# Patient Record
Sex: MALE | Race: WHITE | NOT HISPANIC OR LATINO | Employment: STUDENT | URBAN - METROPOLITAN AREA
[De-identification: names, ages, dates, MRNs, and addresses within clinical notes are randomized per-mention and may not be internally consistent; named-entity substitution may affect disease eponyms.]

---
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
## 2024-03-14 ENCOUNTER — OFFICE VISIT (OUTPATIENT)
Dept: FAMILY MEDICINE CLINIC | Facility: CLINIC | Age: 19
End: 2024-03-14
Payer: COMMERCIAL

## 2024-03-14 VITALS
WEIGHT: 179.2 LBS | DIASTOLIC BLOOD PRESSURE: 62 MMHG | RESPIRATION RATE: 16 BRPM | HEART RATE: 81 BPM | SYSTOLIC BLOOD PRESSURE: 120 MMHG | OXYGEN SATURATION: 98 % | BODY MASS INDEX: 26.54 KG/M2 | TEMPERATURE: 98.1 F | HEIGHT: 69 IN

## 2024-03-14 DIAGNOSIS — Z20.2 POSSIBLE EXPOSURE TO STD: ICD-10-CM

## 2024-03-14 DIAGNOSIS — J02.9 VIRAL PHARYNGITIS: Primary | ICD-10-CM

## 2024-03-14 PROCEDURE — 99213 OFFICE O/P EST LOW 20 MIN: CPT | Performed by: FAMILY MEDICINE

## 2024-03-14 NOTE — PROGRESS NOTES
Assessment/Plan:  Problem List Items Addressed This Visit    None  Visit Diagnoses     Viral pharyngitis    -  Primary    Possible exposure to STD        Relevant Orders    HIV 1/2 AG/AB W REFLEX [facility] and [facility] only    Herpes I/II IgG VONNIE w Reflex to HSV-2    Chlamydia/GC amplified DNA by PCR      I advised the patient that likely his symptoms related to a resolving viral pharyngitis.  On exam his throat actually looks improved.  I advised him that no further treatment is necessary.  I did advise him that his send out strep culture and EBV titers were negative for any acute infection.  It did show that he had evidence of a past exposure to EBV.  He did request STD testing after recent exposure which we did order and we will follow-up with the results.  He will call with any worsening symptoms.    Return if symptoms worsen or fail to improve.   I spent 15 minutes during the visit reviewing the history from the patient, performing the examination, discussing the findings with the patient, providing counseling and education, and making a plan. I spent 15 minutes ordering referrals and testing and documenting.    Subjective:   Chief Complaint   Patient presents with   • Follow-up     From urgent care        Patient ID: Marcos Arenas is a 19 y.o. male presents today for follow-up from recent urgent care visit.  Marcos Arenas is a 19 y.o. male who presents today for a follow-up from recent urgent care visit at [facility] on March 5, 2024 where he was diagnosed with acute pharyngitis.  He had a rapid strep test which came back negative.  They also did a send out strep culture which came back negative as well as a Monospot which was negative.  He did have an EBV titer which demonstrated past exposure to mono.  He is here today for follow-up.  He was told it ws viral- there is no change or improvement.  He is feeling the same.  There are no fevers.  There is no pain with swallowing.  There are sick  contacts.  He was having Flu like symptoms at onset and congestion and that is better. There is no cough. There is no pain.    The patient currently denies any nausea, vomiting, or GERD symptoms.  he has normal bowel movements and normal urine output.  he has a normal appetite.  He is feeling better, but he does not like how his throat looks.  There is no dysphagia.    He did have a little cyst inside his lower gum which has resolved.  He was concerned about this.  He reports that he went to be tested for STDs because of an episode of unprotected sex over the last several weeks.  There are no symptoms.      Sore Throat   This is a new problem. The current episode started 1 to 4 weeks ago. The problem has been gradually improving. There has been no fever. The patient is experiencing no pain. Pertinent negatives include no abdominal pain, congestion, coughing, diarrhea, drooling, ear discharge, ear pain, headaches, hoarse voice, plugged ear sensation, neck pain, shortness of breath, stridor, swollen glands, trouble swallowing or vomiting.     The following portions of the patient's history were reviewed and updated as appropriate: allergies, current medications, past family history, past medical history, past social history, past surgical history and problem list.  Patient Active Problem List   Diagnosis   • Acute seasonal allergic rhinitis   • Muscle spasm     Past Medical History:   Diagnosis Date   • Allergic     seasonal   • Hemorrhoids    • Scoliosis      Past Surgical History:   Procedure Laterality Date   • FOOT SURGERY Right     mole removal.     • FOOT SURGERY Right    • WISDOM TOOTH EXTRACTION       Allergies   Allergen Reactions   • Acetaminophen-Codeine Other (See Comments)     hyperactive/anxious per mom     Family History   Problem Relation Age of Onset   • Colon polyps Mother    • Arthritis Mother    • Asthma Mother    • Hearing loss Mother    • Irritable bowel syndrome Mother    • Colon polyps Father     • No Known Problems Brother    • Arthritis Maternal Grandfather    • Colon polyps Maternal Grandfather    • Arthritis Maternal Grandmother    • Heart disease Maternal Grandmother         AFpito, PVCs     Social History     Socioeconomic History   • Marital status: Single     Spouse name: Not on file   • Number of children: Not on file   • Years of education: Not on file   • Highest education level: Not on file   Occupational History   • Not on file   Tobacco Use   • Smoking status: Never     Passive exposure: Never   • Smokeless tobacco: Never   Vaping Use   • Vaping status: Never Used   Substance and Sexual Activity   • Alcohol use: Never   • Drug use: Never   • Sexual activity: Never   Other Topics Concern   • Not on file   Social History Narrative   • Not on file     Social Determinants of Health     Financial Resource Strain: Not on file   Food Insecurity: Not on file   Transportation Needs: Not on file   Physical Activity: Sufficiently Active (4/7/2021)    Exercise Vital Sign    • Days of Exercise per Week: 5 days    • Minutes of Exercise per Session: 60 min   Stress: No Stress Concern Present (4/7/2021)    [employer] [employer] - Occupational Stress Questionnaire    • Feeling of Stress : Not at all   Social Connections: Not on file   Intimate Partner Violence: Not At Risk (3/14/2024)    Humiliation, Afraid, Rape, and Kick questionnaire    • Fear of Current or Ex-Partner: No    • Emotionally Abused: No    • Physically Abused: No    • Sexually Abused: No   Housing Stability: Not on file     Current Outpatient Medications on File Prior to Visit   Medication Sig Dispense Refill   • [DISCONTINUED] brompheniramine-pseudoephedrine-DM 30-2-10 MG/5ML syrup Swallow 10ml q6hrs prn congestion/cough (Patient not taking: Reported on 12/26/2023)     • [DISCONTINUED] methylPREDNISolone 4 MG tablet therapy pack Use as directed on package 1 each 0     No current facility-administered medications on file prior  "to visit.     Review of Systems   Constitutional: Negative.    HENT:  Positive for sore throat. Negative for congestion, drooling, ear discharge, ear pain, hoarse voice and trouble swallowing.    Eyes: Negative.    Respiratory: Negative.  Negative for cough, shortness of breath and stridor.    Cardiovascular: Negative.    Gastrointestinal: Negative.  Negative for abdominal pain, diarrhea and vomiting.   Endocrine: Negative.    Genitourinary: Negative.    Musculoskeletal: Negative.  Negative for neck pain.   Skin: Negative.    Allergic/Immunologic: Negative.    Neurological: Negative.  Negative for headaches.   Hematological: Negative.    Psychiatric/Behavioral: Negative.         Objective:  Vitals:    03/14/24 0828   BP: 120/62   BP Location: Left arm   Patient Position: Sitting   Cuff Size: Large   Pulse: 81   Resp: 16   Temp: 98.1 °F (36.7 °C)   SpO2: 98%   Weight: 81.3 kg (179 lb 3.2 oz)   Height: 5' 9\" (1.753 m)     Body mass index is 26.46 kg/m².     Physical Exam  Vitals and nursing note reviewed.   Constitutional:       General: He is not in acute distress.     Appearance: Normal appearance. He is well-developed. He is not diaphoretic.   HENT:      Head: Normocephalic and atraumatic.      Right Ear: Tympanic membrane, ear canal and external ear normal.      Left Ear: Tympanic membrane, ear canal and external ear normal.      Nose: No congestion or rhinorrhea.      Mouth/Throat:      Mouth: Mucous membranes are moist.      Pharynx: Oropharynx is clear.   Eyes:      Extraocular Movements: Extraocular movements intact.      Pupils: Pupils are equal, round, and reactive to light.   Neck:      Thyroid: No thyromegaly.      Vascular: No JVD.      Trachea: No tracheal deviation.   Cardiovascular:      Rate and Rhythm: Normal rate and regular rhythm.      Heart sounds: Normal heart sounds. No murmur heard.     No friction rub. No gallop.   Pulmonary:      Effort: Pulmonary effort is normal. No respiratory distress. "      Breath sounds: Normal breath sounds. No stridor. No wheezing or rales.   Chest:      Chest wall: No tenderness.   Abdominal:      General: Bowel sounds are normal. There is no distension.      Palpations: Abdomen is soft. There is no mass.      Tenderness: There is no abdominal tenderness. There is no guarding or rebound.   Musculoskeletal:         General: Normal range of motion.      Cervical back: Normal range of motion and neck supple.   Lymphadenopathy:      Cervical: No cervical adenopathy.   Skin:     General: Skin is warm and dry.      Coloration: Skin is not pale.      Findings: No erythema or rash.   Neurological:      Mental Status: He is alert and oriented to person, place, and time.      Cranial Nerves: No cranial nerve deficit.      Motor: No abnormal muscle tone.      Coordination: Coordination normal.      Deep Tendon Reflexes: Reflexes are normal and symmetric. Reflexes normal.             Depression Screening and Follow-up Plan: Patient was screened for depression during today's encounter. They screened negative with a PHQ-2 score of 0.

## 2024-03-15 LAB
C TRACH RRNA SPEC QL NAA+PROBE: NOT DETECTED
HIV 1+2 AB+HIV1 P24 AG SERPL QL IA: NORMAL
HSV1 IGG SER IA-ACNC: <0.9 INDEX
HSV2 IGG SER IA-ACNC: 1.05 INDEX
N GONORRHOEA RRNA SPEC QL NAA+PROBE: NOT DETECTED

## 2024-04-10 LAB
ALBUMIN SERPL-MCNC: 4.5 G/DL (ref 3.6–5.1)
ALBUMIN/GLOB SERPL: 1.6 (CALC) (ref 1–2.5)
ALP SERPL-CCNC: 95 U/L (ref 36–130)
ALT SERPL-CCNC: 20 U/L (ref 9–46)
APPEARANCE UR: CLEAR
AST SERPL-CCNC: 20 U/L (ref 10–40)
BILIRUB SERPL-MCNC: 1.3 MG/DL (ref 0.2–1.2)
BILIRUB UR QL STRIP: NEGATIVE
BUN SERPL-MCNC: 19 MG/DL (ref 7–25)
BUN/CREAT SERPL: ABNORMAL (CALC) (ref 6–22)
CALCIUM SERPL-MCNC: 9.8 MG/DL (ref 8.6–10.3)
CHLORIDE SERPL-SCNC: 102 MMOL/L (ref 98–110)
CHOLEST SERPL-MCNC: 177 MG/DL
CHOLEST/HDLC SERPL: 2.9 (CALC)
CO2 SERPL-SCNC: 28 MMOL/L (ref 20–32)
COLOR UR: YELLOW
CREAT SERPL-MCNC: 0.96 MG/DL (ref 0.6–1.24)
GFR/BSA.PRED SERPLBLD CYS-BASED-ARV: 116 ML/MIN/1.73M2
GLOBULIN SER CALC-MCNC: 2.9 G/DL (CALC) (ref 1.9–3.7)
GLUCOSE SERPL-MCNC: 93 MG/DL (ref 65–99)
GLUCOSE UR QL STRIP: NEGATIVE
HDLC SERPL-MCNC: 62 MG/DL
HGB UR QL STRIP: NEGATIVE
KETONES UR QL STRIP: NEGATIVE
LDLC SERPL CALC-MCNC: 99 MG/DL (CALC)
LDLC SERPL DIRECT ASSAY-MCNC: 100 MG/DL
LEUKOCYTE ESTERASE UR QL STRIP: NEGATIVE
NITRITE UR QL STRIP: NEGATIVE
NONHDLC SERPL-MCNC: 115 MG/DL (CALC)
PH UR STRIP: 7 [PH] (ref 5–8)
POTASSIUM SERPL-SCNC: 4.4 MMOL/L (ref 3.5–5.3)
PROT SERPL-MCNC: 7.4 G/DL (ref 6.1–8.1)
PROT UR QL STRIP: NEGATIVE
SODIUM SERPL-SCNC: 139 MMOL/L (ref 135–146)
SP GR UR STRIP: 1 (ref 1–1.03)
TRIGL SERPL-MCNC: 75 MG/DL
TSH SERPL-ACNC: 1.31 MIU/L (ref 0.4–4.5)

## 2024-04-18 ENCOUNTER — OFFICE VISIT (OUTPATIENT)
Dept: FAMILY MEDICINE CLINIC | Facility: CLINIC | Age: 19
End: 2024-04-18
Payer: COMMERCIAL

## 2024-04-18 VITALS
OXYGEN SATURATION: 97 % | WEIGHT: 180 LBS | HEART RATE: 95 BPM | RESPIRATION RATE: 16 BRPM | DIASTOLIC BLOOD PRESSURE: 70 MMHG | TEMPERATURE: 97.9 F | BODY MASS INDEX: 26.66 KG/M2 | HEIGHT: 69 IN | SYSTOLIC BLOOD PRESSURE: 120 MMHG

## 2024-04-18 DIAGNOSIS — Z00.00 ANNUAL PHYSICAL EXAM: Primary | ICD-10-CM

## 2024-04-18 DIAGNOSIS — Z20.2 POSSIBLE EXPOSURE TO STD: ICD-10-CM

## 2024-04-18 PROCEDURE — 99395 PREV VISIT EST AGE 18-39: CPT | Performed by: FAMILY MEDICINE

## 2024-04-18 NOTE — PROGRESS NOTES
ADULT ANNUAL PHYSICAL  [facility]    NAME: Marcos Arenas  AGE: 19 y.o. SEX: male  : 2005     DATE: 2024     Assessment and Plan:     Problem List Items Addressed This Visit    None  Visit Diagnoses     Annual physical exam    -  Primary    Possible exposure to STD        Relevant Orders    Herpes I/II IgG VONNIE w Reflex to HSV-2      The patient had a normal exam in the office today.  He will continue with his healthy diet and exercise.  His lab work all look very good.  He did have evidence of an equivocal herpes antibody on his lab work.  We will send for the additional testing in a month as ordered.  We will see him back in the office in a year for physical.      Immunizations and preventive care screenings were discussed with patient today. Appropriate education was printed on patient's after visit summary.    Counseling:  Dental Health: discussed importance of regular tooth brushing, flossing, and dental visits.  Injury prevention: discussed safety/seat belts, safety helmets, smoke detectors, carbon dioxide detectors, and smoking near bedding or upholstery.  Exercise: the importance of regular exercise/physical activity was discussed. Recommend exercise 3-5 times per week for at least 30 minutes.       Depression Screening and Follow-up Plan: Patient was screened for depression during today's encounter. They screened negative with a PHQ-2 score of 0.        Return in about 1 year (around 2025) for Annual physical.     Chief Complaint:     Chief Complaint   Patient presents with   • Annual Exam      History of Present Illness:     Adult Annual Physical   Patient here for a comprehensive physical exam. The patient reports no problems.  The patient denies any chest pain, shortness of breath, or palpitations.  There is no edema.  There are no headaches or visual changes.  There is no lightheadedness, dizziness, or fainting  spells.  The patient currently denies any nausea, vomiting, or GERD symptoms.  he has normal bowel movements and normal urine output.  he has a normal appetite.  He is working on improving his diet.    Diet and Physical Activity  Diet/Nutrition: well balanced diet, limited junk food, low carb diet, and consuming 3-5 servings of fruits/vegetables daily.   Exercise: moderate cardiovascular exercise.      Depression Screening  PHQ-2/9 Depression Screening    Little interest or pleasure in doing things: 0 - not at all  Feeling down, depressed, or hopeless: 0 - not at all  PHQ-2 Score: 0  PHQ-2 Interpretation: Negative depression screen       General Health  Sleep: sleeps well.   Hearing: normal - bilateral.  Vision: no vision problems and most recent eye exam <1 year ago.   Dental: regular dental visits and brushes teeth twice daily.        Health  History of STDs?: no.         Review of Systems:     Review of Systems   Constitutional: Negative.    HENT: Negative.     Eyes: Negative.    Respiratory: Negative.     Cardiovascular: Negative.    Gastrointestinal: Negative.    Endocrine: Negative.    Genitourinary: Negative.    Musculoskeletal: Negative.    Skin: Negative.    Allergic/Immunologic: Negative.    Neurological: Negative.    Hematological: Negative.    Psychiatric/Behavioral: Negative.        Past Medical History:     Past Medical History:   Diagnosis Date   • Allergic     seasonal   • Hemorrhoids    • Scoliosis       Past Surgical History:     Past Surgical History:   Procedure Laterality Date   • FOOT SURGERY Right     mole removal.     • FOOT SURGERY Right    • WISDOM TOOTH EXTRACTION        Social History:     Social History     Socioeconomic History   • Marital status: Single     Spouse name: None   • Number of children: None   • Years of education: None   • Highest education level: None   Occupational History   • None   Tobacco Use   • Smoking status: Never     Passive exposure: Never   • Smokeless  "tobacco: Never   Vaping Use   • Vaping status: Never Used   Substance and Sexual Activity   • Alcohol use: Never   • Drug use: Never   • Sexual activity: Never   Other Topics Concern   • None   Social History Narrative   • None     Social Determinants of Health     Financial Resource Strain: Not on file   Food Insecurity: Not on file   Transportation Needs: Not on file   Physical Activity: Sufficiently Active (4/7/2021)    Exercise Vital Sign    • Days of Exercise per Week: 5 days    • Minutes of Exercise per Session: 60 min   Stress: No Stress Concern Present (4/7/2021)    [employer] [place] - Occupational Stress Questionnaire    • Feeling of Stress : Not at all   Social Connections: Not on file   Intimate Partner Violence: Not At Risk (3/14/2024)    Humiliation, Afraid, Rape, and Kick questionnaire    • Fear of Current or Ex-Partner: No    • Emotionally Abused: No    • Physically Abused: No    • Sexually Abused: No   Housing Stability: Not on file      Family History:     Family History   Problem Relation Age of Onset   • Colon polyps Mother    • Arthritis Mother    • Asthma Mother    • Hearing loss Mother    • Irritable bowel syndrome Mother    • Colon polyps Father    • No Known Problems Brother    • Arthritis Maternal Grandfather    • Colon polyps Maternal Grandfather    • Arthritis Maternal Grandmother    • Heart disease Maternal Grandmother         AFib, PVCs      Current Medications:     No current outpatient medications on file.     No current facility-administered medications for this visit.      Allergies:     Allergies   Allergen Reactions   • Acetaminophen-Codeine Other (See Comments)     hyperactive/anxious per mom      Physical Exam:     /70 (BP Location: Right arm, Patient Position: Sitting, Cuff Size: Standard)   Pulse 95   Temp 97.9 °F (36.6 °C) (Tympanic)   Resp 16   Ht 5' 9\" (1.753 m)   Wt 81.6 kg (180 lb)   SpO2 97%   BMI 26.58 kg/m²     Physical Exam  Vitals " and nursing note reviewed. Exam conducted with a chaperone present.   Constitutional:       Appearance: Normal appearance. He is well-developed.   HENT:      Head: Normocephalic and atraumatic.      Right Ear: External ear normal.      Left Ear: External ear normal.      Nose: Nose normal.      Mouth/Throat:      Mouth: Mucous membranes are moist.      Pharynx: Oropharynx is clear. No oropharyngeal exudate.   Eyes:      Extraocular Movements: Extraocular movements intact.      Conjunctiva/sclera: Conjunctivae normal.      Pupils: Pupils are equal, round, and reactive to light.   Neck:      Thyroid: No thyromegaly.      Trachea: No tracheal deviation.   Cardiovascular:      Rate and Rhythm: Normal rate and regular rhythm.      Pulses: Normal pulses.      Heart sounds: Normal heart sounds. No murmur heard.     No friction rub. No gallop.   Pulmonary:      Effort: Pulmonary effort is normal. No respiratory distress.      Breath sounds: Normal breath sounds. No stridor. No wheezing or rales.   Chest:      Chest wall: No tenderness.   Abdominal:      General: Bowel sounds are normal. There is no distension.      Palpations: Abdomen is soft. There is no mass.      Tenderness: There is no abdominal tenderness. There is no guarding or rebound.      Hernia: No hernia is present.   Genitourinary:     Penis: Normal. No tenderness.       Testes: Normal.      Comments: There were no external hernias.  Musculoskeletal:         General: No tenderness or deformity. Normal range of motion.      Cervical back: Normal range of motion and neck supple.   Lymphadenopathy:      Cervical: No cervical adenopathy.   Skin:     General: Skin is warm and dry.      Coloration: Skin is not pale.      Findings: No erythema or rash.   Neurological:      Mental Status: He is alert and oriented to person, place, and time.      Cranial Nerves: No cranial nerve deficit.      Sensory: No sensory deficit.      Motor: No abnormal muscle tone.       Coordination: Coordination normal.      Deep Tendon Reflexes: Reflexes normal.   Psychiatric:         Mood and Affect: Mood normal.         Behavior: Behavior normal.         Thought Content: Thought content normal.         Judgment: Judgment normal.          Yessica Dao DO   [facility]

## 2024-04-19 PROBLEM — M62.838 MUSCLE SPASM: Status: RESOLVED | Noted: 2023-09-17 | Resolved: 2024-04-19

## 2024-05-02 LAB
HSV1 IGG SER IA-ACNC: <0.9 INDEX
HSV2 IGG SER IA-ACNC: 0.96 INDEX

## 2024-09-27 ENCOUNTER — OFFICE VISIT (OUTPATIENT)
Dept: FAMILY MEDICINE CLINIC | Facility: CLINIC | Age: 19
End: 2024-09-27
Payer: COMMERCIAL

## 2024-09-27 VITALS
TEMPERATURE: 98.4 F | HEART RATE: 76 BPM | HEIGHT: 69 IN | WEIGHT: 182 LBS | RESPIRATION RATE: 17 BRPM | SYSTOLIC BLOOD PRESSURE: 122 MMHG | OXYGEN SATURATION: 97 % | DIASTOLIC BLOOD PRESSURE: 70 MMHG | BODY MASS INDEX: 26.96 KG/M2

## 2024-09-27 DIAGNOSIS — M25.541 JOINT PAIN IN FINGERS OF BOTH HANDS: ICD-10-CM

## 2024-09-27 DIAGNOSIS — L70.0 ACNE VULGARIS: ICD-10-CM

## 2024-09-27 DIAGNOSIS — R53.83 FATIGUE, UNSPECIFIED TYPE: Primary | ICD-10-CM

## 2024-09-27 DIAGNOSIS — M25.542 JOINT PAIN IN FINGERS OF BOTH HANDS: ICD-10-CM

## 2024-09-27 PROCEDURE — 99214 OFFICE O/P EST MOD 30 MIN: CPT

## 2024-09-27 NOTE — PROGRESS NOTES
Ambulatory Visit  Name: Marcos Arenas      : 2005      MRN: [id number]  Encounter Provider: VERONIKA Chong  Encounter Date: 2024   Encounter department: [facility]    Assessment & Plan  Fatigue, unspecified type  Differential includes Lyme, iron or vitamin deficiency, infection. The patient will obtain the lab work ordered today. The patient will be notified of results when available and also any further recommendations.  Orders:    Comprehensive metabolic panel; Future    C-reactive protein; Future    Lyme Total AB W Reflex to IGM/IGG; Future    TSH, 3rd generation with Free T4 reflex; Future    Vitamin D 25 hydroxy; Future    CBC and differential; Future    Mononucleosis screen; Future    Iron, TIBC and Ferritin Panel; Future    YESSI Screen w/ Reflex to Titer/Pattern; Future    Rheumatoid Factor (IgA, IgG, IgM); Future    Comprehensive metabolic panel    C-reactive protein    Lyme Total AB W Reflex to IGM/IGG    TSH, 3rd generation with Free T4 reflex    Vitamin D 25 hydroxy    CBC and differential    Mononucleosis screen    Iron, TIBC and Ferritin Panel    YESSI Screen w/ Reflex to Titer/Pattern    Rheumatoid Factor (IgA, IgG, IgM)    Joint pain in fingers of both hands  Differential includes Lyme or inflammatory disease. The patient will obtain the lab work ordered today. The patient will be notified of results when available and also any further recommendations.  Orders:    Comprehensive metabolic panel; Future    C-reactive protein; Future    Lyme Total AB W Reflex to IGM/IGG; Future    TSH, 3rd generation with Free T4 reflex; Future    CBC and differential; Future    YESSI Screen w/ Reflex to Titer/Pattern; Future    Rheumatoid Factor (IgA, IgG, IgM); Future    Comprehensive metabolic panel    C-reactive protein    Lyme Total AB W Reflex to IGM/IGG    TSH, 3rd generation with Free T4 reflex    CBC and differential    YESSI Screen w/ Reflex to Titer/Pattern     "Rheumatoid Factor (IgA, IgG, IgM)    Acne vulgaris  Patient has upcoming appointment with Dermatology. Advised proper face washing, silk pillowcase, avoidance of sugary foods, avoiding touching face, avoiding scented body washes.      Follow up as needed or at next regularly scheduled appointment.     History of Present Illness     Marcos Arenas is a 19 y.o. year old male who presents today with a concern of fatigue and joint pain. Hands \"feel tight\"- no numbness or tingling. No swelling. Taking naps - unusual for him. Works outside a lot.       Fatigue  The current episode started more than 1 month ago (Started over the summer - beginning of July). The problem occurs daily. The problem has been gradually worsening. Associated symptoms include arthralgias and fatigue. Pertinent negatives include no abdominal pain, chest pain, chills, congestion, coughing, fever, headaches, joint swelling, myalgias, nausea, numbness, rash, sore throat, swollen glands, vomiting or weakness. Nothing aggravates the symptoms. He has tried rest (vitamins) for the symptoms. The treatment provided no relief.         Review of Systems   Constitutional:  Positive for fatigue. Negative for chills and fever.   HENT: Negative.  Negative for congestion, ear pain, rhinorrhea and sore throat.    Eyes: Negative.  Negative for pain and visual disturbance.   Respiratory: Negative.  Negative for cough and shortness of breath.    Cardiovascular: Negative.  Negative for chest pain, palpitations and leg swelling.   Gastrointestinal:  Negative for abdominal pain, constipation, diarrhea, nausea and vomiting.   Endocrine: Negative.    Genitourinary: Negative.  Negative for dysuria, frequency and urgency.   Musculoskeletal:  Positive for arthralgias. Negative for back pain, joint swelling and myalgias.   Skin: Negative.  Negative for rash.   Allergic/Immunologic: Negative.    Neurological: Negative.  Negative for dizziness, weakness, light-headedness, " "numbness and headaches.   Hematological: Negative.    Psychiatric/Behavioral: Negative.             Objective     /70 (BP Location: Left arm, Patient Position: Sitting, Cuff Size: Large)   Pulse 76   Temp 98.4 °F (36.9 °C) (Tympanic)   Resp 17   Ht 5' 9\" (1.753 m)   Wt 82.6 kg (182 lb)   SpO2 97%   BMI 26.88 kg/m²     Physical Exam  Vitals and nursing note reviewed.   Constitutional:       General: He is not in acute distress.     Appearance: Normal appearance. He is not ill-appearing.   HENT:      Head: Normocephalic and atraumatic.   Cardiovascular:      Rate and Rhythm: Normal rate and regular rhythm.      Heart sounds: Normal heart sounds. No murmur heard.  Pulmonary:      Effort: Pulmonary effort is normal. No respiratory distress.      Breath sounds: Normal breath sounds. No wheezing.   Abdominal:      General: Abdomen is flat. Bowel sounds are normal.      Palpations: Abdomen is soft.      Tenderness: There is no abdominal tenderness.   Musculoskeletal:         General: Normal range of motion.      Right hand: Normal. No swelling, deformity, tenderness or bony tenderness. Normal range of motion. Normal strength. Normal capillary refill. Normal pulse.      Left hand: Bony tenderness present. No swelling, deformity or tenderness. Normal range of motion. Normal strength. Normal capillary refill. Normal pulse.        Arms:       Cervical back: Normal range of motion.   Lymphadenopathy:      Cervical: No cervical adenopathy.   Skin:     General: Skin is warm and dry.      Capillary Refill: Capillary refill takes less than 2 seconds.   Neurological:      General: No focal deficit present.      Mental Status: He is alert and oriented to person, place, and time.   Psychiatric:         Mood and Affect: Mood normal.         Behavior: Behavior normal.         "

## 2024-09-27 NOTE — ASSESSMENT & PLAN NOTE
Patient has upcoming appointment with Dermatology. Advised proper face washing, silk pillowcase, avoidance of sugary foods, avoiding touching face, avoiding scented body washes.      Follow up as needed or at next regularly scheduled appointment.

## 2024-10-06 LAB
25(OH)D3 SERPL-MCNC: 64 NG/ML (ref 30–100)
ALBUMIN SERPL-MCNC: 4.5 G/DL (ref 3.6–5.1)
ALBUMIN/GLOB SERPL: 1.7 (CALC) (ref 1–2.5)
ALP SERPL-CCNC: 74 U/L (ref 36–130)
ALT SERPL-CCNC: 18 U/L (ref 9–46)
AST SERPL-CCNC: 20 U/L (ref 10–40)
BASOPHILS # BLD AUTO: 20 CELLS/UL (ref 0–200)
BASOPHILS NFR BLD AUTO: 0.6 %
BILIRUB SERPL-MCNC: 1.6 MG/DL (ref 0.2–1.2)
BUN SERPL-MCNC: 15 MG/DL (ref 7–25)
BUN/CREAT SERPL: ABNORMAL (CALC) (ref 6–22)
CALCIUM SERPL-MCNC: 9.6 MG/DL (ref 8.6–10.3)
CHLORIDE SERPL-SCNC: 102 MMOL/L (ref 98–110)
CO2 SERPL-SCNC: 29 MMOL/L (ref 20–32)
CREAT SERPL-MCNC: 0.94 MG/DL (ref 0.6–1.24)
CRP SERPL-MCNC: <3 MG/L
EOSINOPHIL # BLD AUTO: 191 CELLS/UL (ref 15–500)
EOSINOPHIL NFR BLD AUTO: 5.8 %
ERYTHROCYTE [DISTWIDTH] IN BLOOD BY AUTOMATED COUNT: 12.2 % (ref 11–15)
FERRITIN SERPL-MCNC: 72 NG/ML (ref 38–380)
GFR/BSA.PRED SERPLBLD CYS-BASED-ARV: 119 ML/MIN/1.73M2
GLOBULIN SER CALC-MCNC: 2.7 G/DL (CALC) (ref 1.9–3.7)
GLUCOSE SERPL-MCNC: 89 MG/DL (ref 65–99)
HCT VFR BLD AUTO: 46.7 % (ref 38.5–50)
HGB BLD-MCNC: 15.5 G/DL (ref 13.2–17.1)
IRON SATN MFR SERPL: 56 % (CALC) (ref 20–48)
IRON SERPL-MCNC: 196 MCG/DL (ref 50–195)
LYMPHOCYTES # BLD AUTO: 1073 CELLS/UL (ref 850–3900)
LYMPHOCYTES NFR BLD AUTO: 32.5 %
MCH RBC QN AUTO: 30.7 PG (ref 27–33)
MCHC RBC AUTO-ENTMCNC: 33.2 G/DL (ref 32–36)
MCV RBC AUTO: 92.5 FL (ref 80–100)
MONOCYTES # BLD AUTO: 432 CELLS/UL (ref 200–950)
MONOCYTES NFR BLD AUTO: 13.1 %
NEUTROPHILS # BLD AUTO: 1584 CELLS/UL (ref 1500–7800)
NEUTROPHILS NFR BLD AUTO: 48 %
PLATELET # BLD AUTO: 315 THOUSAND/UL (ref 140–400)
PMV BLD REES-ECKER: 8.5 FL (ref 7.5–12.5)
POTASSIUM SERPL-SCNC: 4.4 MMOL/L (ref 3.5–5.3)
PROT SERPL-MCNC: 7.2 G/DL (ref 6.1–8.1)
RBC # BLD AUTO: 5.05 MILLION/UL (ref 4.2–5.8)
SODIUM SERPL-SCNC: 139 MMOL/L (ref 135–146)
TIBC SERPL-MCNC: 348 MCG/DL (CALC) (ref 250–425)
TSH SERPL-ACNC: 1.36 MIU/L (ref 0.4–4.5)
WBC # BLD AUTO: 3.3 THOUSAND/UL (ref 3.8–10.8)

## 2024-10-09 LAB
25(OH)D3 SERPL-MCNC: 64 NG/ML (ref 30–100)
ALBUMIN SERPL-MCNC: 4.5 G/DL (ref 3.6–5.1)
ALBUMIN/GLOB SERPL: 1.7 (CALC) (ref 1–2.5)
ALP SERPL-CCNC: 74 U/L (ref 36–130)
ALT SERPL-CCNC: 18 U/L (ref 9–46)
ANA SER QL IF: NEGATIVE
AST SERPL-CCNC: 20 U/L (ref 10–40)
BASOPHILS # BLD AUTO: 20 CELLS/UL (ref 0–200)
BASOPHILS NFR BLD AUTO: 0.6 %
BILIRUB SERPL-MCNC: 1.6 MG/DL (ref 0.2–1.2)
BUN SERPL-MCNC: 15 MG/DL (ref 7–25)
BUN/CREAT SERPL: ABNORMAL (CALC) (ref 6–22)
CALCIUM SERPL-MCNC: 9.6 MG/DL (ref 8.6–10.3)
CHLORIDE SERPL-SCNC: 102 MMOL/L (ref 98–110)
CO2 SERPL-SCNC: 29 MMOL/L (ref 20–32)
CREAT SERPL-MCNC: 0.94 MG/DL (ref 0.6–1.24)
CRP SERPL-MCNC: <3 MG/L
EOSINOPHIL # BLD AUTO: 191 CELLS/UL (ref 15–500)
EOSINOPHIL NFR BLD AUTO: 5.8 %
ERYTHROCYTE [DISTWIDTH] IN BLOOD BY AUTOMATED COUNT: 12.2 % (ref 11–15)
FERRITIN SERPL-MCNC: 72 NG/ML (ref 38–380)
GFR/BSA.PRED SERPLBLD CYS-BASED-ARV: 119 ML/MIN/1.73M2
GLOBULIN SER CALC-MCNC: 2.7 G/DL (CALC) (ref 1.9–3.7)
GLUCOSE SERPL-MCNC: 89 MG/DL (ref 65–99)
HCT VFR BLD AUTO: 46.7 % (ref 38.5–50)
HETEROPH AB SER QL LA: NEGATIVE
HGB BLD-MCNC: 15.5 G/DL (ref 13.2–17.1)
IRON SATN MFR SERPL: 56 % (CALC) (ref 20–48)
IRON SERPL-MCNC: 196 MCG/DL (ref 50–195)
LYMPHOCYTES # BLD AUTO: 1073 CELLS/UL (ref 850–3900)
LYMPHOCYTES NFR BLD AUTO: 32.5 %
MCH RBC QN AUTO: 30.7 PG (ref 27–33)
MCHC RBC AUTO-ENTMCNC: 33.2 G/DL (ref 32–36)
MCV RBC AUTO: 92.5 FL (ref 80–100)
MONOCYTES # BLD AUTO: 432 CELLS/UL (ref 200–950)
MONOCYTES NFR BLD AUTO: 13.1 %
NEUTROPHILS # BLD AUTO: 1584 CELLS/UL (ref 1500–7800)
NEUTROPHILS NFR BLD AUTO: 48 %
PLATELET # BLD AUTO: 315 THOUSAND/UL (ref 140–400)
PMV BLD REES-ECKER: 8.5 FL (ref 7.5–12.5)
POTASSIUM SERPL-SCNC: 4.4 MMOL/L (ref 3.5–5.3)
PROT SERPL-MCNC: 7.2 G/DL (ref 6.1–8.1)
RBC # BLD AUTO: 5.05 MILLION/UL (ref 4.2–5.8)
RF IGA SER-ACNC: <5 U
RF IGG SER-ACNC: <5 U
RF IGM SER-ACNC: 6 U
SODIUM SERPL-SCNC: 139 MMOL/L (ref 135–146)
TIBC SERPL-MCNC: 348 MCG/DL (CALC) (ref 250–425)
TSH SERPL-ACNC: 1.36 MIU/L (ref 0.4–4.5)
WBC # BLD AUTO: 3.3 THOUSAND/UL (ref 3.8–10.8)